# Patient Record
Sex: FEMALE | Race: WHITE
[De-identification: names, ages, dates, MRNs, and addresses within clinical notes are randomized per-mention and may not be internally consistent; named-entity substitution may affect disease eponyms.]

---

## 2017-05-02 ENCOUNTER — HOSPITAL ENCOUNTER (OUTPATIENT)
Dept: HOSPITAL 45 - C.ONC | Age: 77
Discharge: HOME | End: 2017-05-02
Attending: PHYSICIAN ASSISTANT
Payer: COMMERCIAL

## 2017-05-02 VITALS
OXYGEN SATURATION: 93 % | SYSTOLIC BLOOD PRESSURE: 126 MMHG | TEMPERATURE: 98.06 F | HEART RATE: 69 BPM | DIASTOLIC BLOOD PRESSURE: 79 MMHG

## 2017-05-02 DIAGNOSIS — Z92.3: ICD-10-CM

## 2017-05-02 DIAGNOSIS — Z85.3: ICD-10-CM

## 2017-05-02 DIAGNOSIS — Z08: Primary | ICD-10-CM

## 2017-05-02 NOTE — RADIATION ONCOLOGY FOLLOW-UP
Radiation Oncology Follow-Up


Date of Visit


May 2, 2017.





Reason For Visit


Annual follow-up





Radiation Completion Date


04/11/14





Diagnosis





(1) Intraductal carcinoma of left breast


Status:  Resolved


Stage:  0


Permanent Comment:  Status post abnormal left breast mammogram 10/10/2013


Left breast biopsy 11/06/2013 revealing ductal carcinoma in situ


Estrogen receptor positive and progesterone receptor positive 


Status post lumpectomy on 11/26/2013.  


Stage pTis NX MX


Status post completion of radiation therapy 04/11/2014 received 6080 cGy


Ongoing treatment with tamoxifen


  Last Edited By: Kenya Puga on May 2, 2017 13:27





Allergies


Coded Allergies:  


     Nizatidine (Verified  Allergy, ?, 1/14/14)





Home Medications


Scheduled


Calcium/Vitamin D (Os-Fahad 500 Plus D), 1 TAB PO DAILY


Multivitamin (Multivitamin), 1 TAB PO DAILY


Omeprazole (Prilosec), 20 MG PO DAILY


Propranolol La (Inderal La), 120 MG PO DAILY


Tamoxifen (Nolvadex), 20 MG PO DAILY


Triamterene/Hctz (Triamterene/Hctz 37.5-25MG), 1 TAB PO DAILY





Scheduled PRN


Meclizine Hcl (Meclizine Hcl), 1 TAB PO TID PRN for Dizziness or Vertigo





Review of Systems


Gastrointestinal:  


   Symptoms:  WNL


Oral:  


   Symptoms:  No Problems


Respiratory:  


   Symptoms:  WNL


Urinary:  


   Symptoms:  WNL


Skin:  


   Symptoms:  No Problems


Breast:  


   Right Upper Arm Measurement:  30.5


   Right Mid Arm Measurement:  26.0


   Right Wrist Measurement:  16.4


   Left Upper Arm Measurement:  28.2


   Left Mid Arm Measurement:  24.4


   Left Wrist Measurement:  16.5


   Arm Dominence:  Right


   Patient Cosmetic Evaluation:  Good


   Staff Cosmetic Evalaluation:  Good





Physical Exam





Vital Signs








  Date Time  Temp Pulse Resp B/P Pulse Ox O2 Delivery O2 Flow Rate FiO2


 


5/2/17 12:52 36.7 69 20 126/79 93   








Fatigue:  None


General Appearance:  no apparent distress


Eyes:  normal inspection, EOMI


ENT:  normal ENT inspection, hearing grossly normal


Neck:  supple, no adenopathy, thyroid normal


Respiratory/Chest:  lungs clear, no respiratory distress, no accessory muscle 

use


Breast:


Breast examination reveals well-healed incision of the left breast.  There are 

no masses or tenderness no axillary adenopathy.  There are mild fibrous changes 

in the area of the incision.  There is slight telangiectasia.  Using the 

[place] score cosmesis there is a good outcome.  The right breast showed no 

masses or tenderness and no axillary adenopathy.


Cardiovascular:  regular rate, rhythm, no gallop, no murmur


Extremities:  no pedal edema


Neurologic/Psychiatric:  no motor/sensory deficits, alert, normal mood/affect


Skin:  warm/dry





Additional Studies


She had a mammogram 11/01/2016.  This was negative, no evidence of malignancy.  

Normal interval follow-up is recommended.  Recheck in 12 months.  BI-RADS 

Category 1.





Assessment & Plan


Plan: Continue regular follow-up with Dr. Falcon, [facility] [place].  She 

continues on tamoxifen.  Continue with annual mammography.  This is scheduled 

for November.  We asked her to return to our office in 1 year.  She may call if 

she has any questions or concerns in the interim.





Total Time In Follow-Up


I spent 20 minutes speaking to the patient and performing examination.  I spent 

15 minutes reviewing information and complaining this note.





Copy To


Adriana Falcon MD; Bryan Mayberry M.D.; Gordon Woods M.D.

## 2018-05-04 ENCOUNTER — HOSPITAL ENCOUNTER (OUTPATIENT)
Dept: HOSPITAL 45 - C.ONC | Age: 78
Discharge: HOME | End: 2018-05-04
Attending: PHYSICIAN ASSISTANT
Payer: COMMERCIAL

## 2018-05-04 VITALS
TEMPERATURE: 98.06 F | OXYGEN SATURATION: 92 % | DIASTOLIC BLOOD PRESSURE: 78 MMHG | HEART RATE: 66 BPM | SYSTOLIC BLOOD PRESSURE: 124 MMHG

## 2018-05-04 DIAGNOSIS — Z85.3: ICD-10-CM

## 2018-05-04 DIAGNOSIS — Z92.3: ICD-10-CM

## 2018-05-04 DIAGNOSIS — Z08: Primary | ICD-10-CM

## 2018-05-04 NOTE — RADIATION ONCOLOGY FOLLOW-UP
Radiation Oncology Follow-Up


Date of Visit


May 4, 2018.





Reason For Visit


Annual follow up





Radiation Completion Date


finished    4-





Diagnosis





(1) Intraductal carcinoma of left breast


Status:  Resolved        Onset Date:  11/6/2013


Stage:  0


Permanent Comment:  Status post abnormal left breast mammogram 10/10/2013


Left breast biopsy 11/06/2013 revealing ductal carcinoma in situ


Estrogen receptor positive and progesterone receptor positive 


Status post lumpectomy on 11/26/2013.  


Stage pTis NX MX


Status post completion of radiation therapy 04/11/2014 received 6080 cGy


Ongoing treatment with tamoxifen


  Last Edited By: Kenya Puga on May 2, 2017 13:27





Interim History


She has been doing well over this past year.  She denies any changes to her 

breast.  She has noted no masses or tenderness no change of the axilla.  She 

has had no swelling of her arm.  She is up-to-date on mammography.  She had a 

mammogram November 2, 2017.  This was negative, no evidence of malignancy.  

Normal interval follow-up is recommended in 12 months.  BI-RADS Category 1.  

She continues on tamoxifen and denies side effects.








She has been having issues with headaches.  These are described as a band 

around the top of her head.  They usually occur daily.  The pain level is 

between 1 and 3.  She takes Tylenol.  She usually takes Tylenol 3 times per 

week.  This helps at times and other times it does not.  She has no associated 

nausea.  She has had no change in vision.  Denies dizziness.  She was seen and 

evaluated.  A CT scan of the head was performed and is still pending.  She has 

noted discomfort of her neck posteriorly.  There is tenderness to touch in the 

lower occipital area.  She has pain with turning her head from side to side.  

She notes that the range of motion of her head from side to side seems to have 

decreased.  She also has lower back pain.





Allergies


Coded Allergies:  


     Nizatidine (Verified  Allergy, ?, 1/14/14)





Home Medications


Scheduled


Calcium/Vitamin D (Os-Fahad 500 Plus D), 1 TAB PO DAILY


Multivitamin (Multivitamin), 1 TAB PO DAILY


Omeprazole (Prilosec), 20 MG PO DAILY


Prednisone (Prednisone), 10 MG PO DAILY


Propranolol La (Inderal La), 120 MG PO DAILY


Tamoxifen (Nolvadex), 20 MG PO DAILY


Triamterene/Hctz (Triamterene/Hctz 37.5-25MG), 1 TAB PO DAILY





Scheduled PRN


Meclizine Hcl (Meclizine Hcl), 1 TAB PO TID PRN for Dizziness or Vertigo





Review of Systems


Gastrointestinal:  


   Symptoms:  WNL


Oral:  


   Symptoms:  No Problems


Respiratory:  


   Symptoms:  WNL


Urinary:  


   Symptoms:  WNL


Skin:  


   Symptoms:  No Problems


Breast:  


   Right Upper Arm Measurement:  31.5


   Right Mid Arm Measurement:  25.0


   Right Wrist Measurement:  17.2


   Left Upper Arm Measurement:  30.0


   Left Mid Arm Measurement:  24.0


   Left Wrist Measurement:  16.8


   Arm Dominence:  Right


   Patient Cosmetic Evaluation:  Excellent


   Staff Cosmetic Evalaluation:  Excellent





Physical Exam





Vital Signs








  Date Time  Temp Pulse Resp B/P (MAP) Pulse Ox O2 Delivery O2 Flow Rate FiO2


 


5/4/18 12:58 36.7 66 16 124/78 92   








ECOG Performance Status:  0


Fatigue:  None


General Appearance:  no apparent distress


Eyes:  normal inspection, EOMI


ENT:  normal ENT inspection, hearing grossly normal, + pertinent finding (

Sinuses are nontender)


Neck:  no adenopathy, thyroid normal, + pertinent finding (She has tenderness 

to palpation of the lower left occipital area.  She is noted to have mild 

decreased range of motion of the head from side to side.)


Respiratory/Chest:  lungs clear, no respiratory distress, no accessory muscle 

use


Breast:


Breast examination reveals well-healed incision of the left breast.  There are 

no masses or tenderness and no axillary adenopathy.  She has no skin 

retractions or nipple changes.  Using the [place] score cosmesis she has an 

excellent outcome.  The right breast showed no masses or tenderness and no 

axillary adenopathy.


Cardiovascular:  regular rate, rhythm, no gallop, no murmur


Extremities:  no pedal edema


Neurologic/Psychiatric:  no motor/sensory deficits, alert, normal mood/affect


Skin:  warm/dry





Pain Management


Patient Reports Pain:  Yes


Side:  Bilateral


Pain Location:  headache


Patient Preferred Pain Scale:  0 - 10


Initial Pain Intensity:  2.0


Pain Management Plan


She is currently taking Tylenol for her headaches.





Laboratory


Laboratory Results:  not applicable





Pathology


Pathology Results:  not applicable





Imaging


Imaging Studies:  were reviewed


Imaging Comments


Reviewed in the interim history.





Assessment & Plan


Plan: Continue annual mammography.  She has her next mammogram scheduled for 

November 2018.  She continues on tamoxifen.  Continue follow-up with medical 

oncology and her primary care physician.  She is awaiting the final results of 

the CT of the head.  I discussed with her that her headaches may be related to 

arthritis of the neck.  I have recommended that she apply warm heat to the 

posterior neck and lower occipital area at least once a day.  She continue 

follow-up with the primary care physician in regards to the neck pain and 

headaches.





Total Time


In Follow-Up


I spent 20 minutes speaking to the patient and performing examination.  I spent 

15 minutes reviewing information on completing this note.





Copy To


Angel Kelly MD; Bryan Mayberry M.D.